# Patient Record
Sex: FEMALE | Race: BLACK OR AFRICAN AMERICAN | NOT HISPANIC OR LATINO | Employment: FULL TIME | URBAN - METROPOLITAN AREA
[De-identification: names, ages, dates, MRNs, and addresses within clinical notes are randomized per-mention and may not be internally consistent; named-entity substitution may affect disease eponyms.]

---

## 2023-07-19 ENCOUNTER — APPOINTMENT (OUTPATIENT)
Dept: LAB | Facility: LAB | Age: 38
End: 2023-07-19

## 2024-12-02 ENCOUNTER — APPOINTMENT (OUTPATIENT)
Dept: PRIMARY CARE | Facility: CLINIC | Age: 39
End: 2024-12-02
Payer: COMMERCIAL

## 2024-12-02 VITALS
BODY MASS INDEX: 39.31 KG/M2 | WEIGHT: 229 LBS | SYSTOLIC BLOOD PRESSURE: 103 MMHG | HEART RATE: 85 BPM | TEMPERATURE: 96 F | DIASTOLIC BLOOD PRESSURE: 68 MMHG

## 2024-12-02 DIAGNOSIS — Z12.4 ENCOUNTER FOR PAPANICOLAOU SMEAR FOR CERVICAL CANCER SCREENING: ICD-10-CM

## 2024-12-02 DIAGNOSIS — Z30.015 ENCOUNTER FOR INITIAL PRESCRIPTION OF VAGINAL RING HORMONAL CONTRACEPTIVE: ICD-10-CM

## 2024-12-02 DIAGNOSIS — Z13.220 SCREENING CHOLESTEROL LEVEL: ICD-10-CM

## 2024-12-02 DIAGNOSIS — Z23 IMMUNIZATION DUE: ICD-10-CM

## 2024-12-02 DIAGNOSIS — R63.5 ABNORMAL WEIGHT GAIN: ICD-10-CM

## 2024-12-02 DIAGNOSIS — Z12.31 VISIT FOR SCREENING MAMMOGRAM: ICD-10-CM

## 2024-12-02 DIAGNOSIS — M79.10 MYALGIA: ICD-10-CM

## 2024-12-02 DIAGNOSIS — Z00.00 HEALTH CARE MAINTENANCE: Primary | ICD-10-CM

## 2024-12-02 DIAGNOSIS — Z13.1 DIABETES MELLITUS SCREENING: ICD-10-CM

## 2024-12-02 DIAGNOSIS — Z12.11 COLON CANCER SCREENING: ICD-10-CM

## 2024-12-02 PROCEDURE — 99204 OFFICE O/P NEW MOD 45 MIN: CPT | Performed by: FAMILY MEDICINE

## 2024-12-02 PROCEDURE — 99386 PREV VISIT NEW AGE 40-64: CPT | Performed by: FAMILY MEDICINE

## 2024-12-02 RX ORDER — ETONOGESTREL AND ETHINYL ESTRADIOL VAGINAL RING .015; .12 MG/D; MG/D
1 RING VAGINAL
Qty: 3 EACH | Refills: 1 | Status: SHIPPED | OUTPATIENT
Start: 2024-12-02 | End: 2025-12-02

## 2024-12-02 RX ORDER — OXYCODONE AND ACETAMINOPHEN 5; 325 MG/1; MG/1
1 TABLET ORAL EVERY 6 HOURS PRN
COMMUNITY

## 2024-12-02 RX ORDER — MELOXICAM 15 MG/1
15 TABLET ORAL
COMMUNITY
Start: 2023-07-16

## 2024-12-02 ASSESSMENT — PAIN SCALES - GENERAL: PAINLEVEL_OUTOF10: 0-NO PAIN

## 2024-12-02 NOTE — ADDENDUM NOTE
Addended by: SALOMON GUADARRAMA on: 12/2/2024 10:14 AM     Modules accepted: Orders, Level of Service

## 2024-12-02 NOTE — PROGRESS NOTES
Subjective     Patient ID: Radha Alvarez is a 39 y.o. female who presents for Annual Exam (NEW PT).  HPI  The patient is a 40 yo AA female with a history of left sciatica, anxiety and depression, obesity, PCOS, vit D deficiency, here for establishment of care and for:    1- a CPE.   -pap smear-4/15/2020:  wnl. No HPV.  -mammogram- 2018-19.  Ordered.  -blood test ordered.  -immunizations: Influenza 11/20/2024.  -PGM - colon cancer and breast cancer.    2- Generalized tender or pulled muscle sensations.   3- weight management.  4- GYN referral.    A review of system was completed.  All systems were reviewed and were normal, except for the ones that are listed in the HPI.    Objective   Physical Exam  Constitutional:       Appearance: Normal appearance.   HENT:      Head: Normocephalic and atraumatic.      Right Ear: Tympanic membrane, ear canal and external ear normal.      Left Ear: Tympanic membrane, ear canal and external ear normal.      Nose: Nose normal.      Mouth/Throat:      Mouth: Mucous membranes are moist.      Pharynx: Oropharynx is clear.   Eyes:      General: Scleral icterus: sudha titter.      Extraocular Movements: Extraocular movements intact.      Conjunctiva/sclera: Conjunctivae normal.      Pupils: Pupils are equal, round, and reactive to light.   Cardiovascular:      Rate and Rhythm: Normal rate and regular rhythm.      Pulses: Normal pulses.   Pulmonary:      Effort: Pulmonary effort is normal.      Breath sounds: Normal breath sounds.   Abdominal:      General: Abdomen is flat. Bowel sounds are normal.      Palpations: Abdomen is soft.   Musculoskeletal:         General: Normal range of motion.      Cervical back: Normal range of motion and neck supple.   Skin:     General: Skin is warm.   Neurological:      General: No focal deficit present.      Mental Status: She is alert and oriented to person, place, and time. Mental status is at baseline.   Psychiatric:         Mood and Affect: Mood  normal.         Behavior: Behavior normal.         Thought Content: Thought content normal.         Judgment: Judgment normal.     Assessment/Plan   Problem List Items Addressed This Visit       Myalgia     -arthritis panel ordered.   -Duloxetine to consider if getting worse.         Screening cholesterol level    Relevant Orders    Lipid Panel    Health care maintenance - Primary     -pap smear-4/15/2020:  wnl. No HPV.  -mammogram- 2018-19.  Ordered.  -blood test ordered.  -immunizations: Influenza 11/20/2024.  -PGM - colon cancer and breast cancer.           Relevant Orders    BI mammo bilateral screening tomosynthesis    Arthritis Panel (CMS)    CBC    Comprehensive Metabolic Panel    Hemoglobin A1C    Lipid Panel    TSH with reflex to Free T4 if abnormal    Diabetes mellitus screening    Relevant Orders    Hemoglobin A1C    Visit for screening mammogram    Relevant Orders    BI mammo bilateral screening tomosynthesis    Colon cancer screening    Immunization due    Abnormal weight gain    Relevant Orders    Referral to Endocrinology    Referral to Nutrition Services     Other Visit Diagnoses       Encounter for Papanicolaou smear for cervical cancer screening        Relevant Orders    Referral to Gynecology    Encounter for initial prescription of vaginal ring hormonal contraceptive        Relevant Medications    etonogestreL-ethinyl estradioL (Nuvaring) 0.12-0.015 mg/24 hr vaginal ring         Patient to return to office in 6- 12 months for reassessment.

## 2024-12-02 NOTE — ASSESSMENT & PLAN NOTE
-pap smear-4/15/2020:  wnl. No HPV.  -mammogram- 2018-19.  Ordered.  -blood test ordered.  -immunizations: Influenza 11/20/2024.  -PGM - colon cancer and breast cancer.

## 2024-12-06 ENCOUNTER — OFFICE VISIT (OUTPATIENT)
Facility: CLINIC | Age: 39
End: 2024-12-06
Payer: COMMERCIAL

## 2024-12-06 ENCOUNTER — LAB (OUTPATIENT)
Dept: LAB | Facility: LAB | Age: 39
End: 2024-12-06
Payer: COMMERCIAL

## 2024-12-06 VITALS
HEIGHT: 64 IN | SYSTOLIC BLOOD PRESSURE: 120 MMHG | BODY MASS INDEX: 40.08 KG/M2 | DIASTOLIC BLOOD PRESSURE: 70 MMHG | RESPIRATION RATE: 16 BRPM | WEIGHT: 234.8 LBS | HEART RATE: 76 BPM

## 2024-12-06 DIAGNOSIS — Z13.220 SCREENING CHOLESTEROL LEVEL: ICD-10-CM

## 2024-12-06 DIAGNOSIS — E66.01 CLASS 3 SEVERE OBESITY DUE TO EXCESS CALORIES WITHOUT SERIOUS COMORBIDITY WITH BODY MASS INDEX (BMI) OF 40.0 TO 44.9 IN ADULT: Primary | ICD-10-CM

## 2024-12-06 DIAGNOSIS — Z00.00 HEALTH CARE MAINTENANCE: ICD-10-CM

## 2024-12-06 DIAGNOSIS — Z13.1 DIABETES MELLITUS SCREENING: ICD-10-CM

## 2024-12-06 DIAGNOSIS — E66.813 CLASS 3 SEVERE OBESITY DUE TO EXCESS CALORIES WITHOUT SERIOUS COMORBIDITY WITH BODY MASS INDEX (BMI) OF 40.0 TO 44.9 IN ADULT: Primary | ICD-10-CM

## 2024-12-06 DIAGNOSIS — R63.5 ABNORMAL WEIGHT GAIN: ICD-10-CM

## 2024-12-06 PROBLEM — E66.811 CLASS 1 OBESITY: Status: ACTIVE | Noted: 2024-12-06

## 2024-12-06 LAB
ALBUMIN SERPL BCP-MCNC: 4.2 G/DL (ref 3.4–5)
ALP SERPL-CCNC: 95 U/L (ref 33–110)
ALT SERPL W P-5'-P-CCNC: 20 U/L (ref 7–45)
ANION GAP SERPL CALC-SCNC: 14 MMOL/L (ref 10–20)
AST SERPL W P-5'-P-CCNC: 20 U/L (ref 9–39)
BILIRUB SERPL-MCNC: 0.5 MG/DL (ref 0–1.2)
BUN SERPL-MCNC: 9 MG/DL (ref 6–23)
CALCIUM SERPL-MCNC: 9.7 MG/DL (ref 8.6–10.6)
CHLORIDE SERPL-SCNC: 102 MMOL/L (ref 98–107)
CHOLEST SERPL-MCNC: 219 MG/DL (ref 0–199)
CHOLESTEROL/HDL RATIO: 3.9
CO2 SERPL-SCNC: 27 MMOL/L (ref 21–32)
CREAT SERPL-MCNC: 0.92 MG/DL (ref 0.5–1.05)
EGFRCR SERPLBLD CKD-EPI 2021: 81 ML/MIN/1.73M*2
ERYTHROCYTE [DISTWIDTH] IN BLOOD BY AUTOMATED COUNT: 12 % (ref 11.5–14.5)
ERYTHROCYTE [SEDIMENTATION RATE] IN BLOOD BY WESTERGREN METHOD: 12 MM/H (ref 0–20)
EST. AVERAGE GLUCOSE BLD GHB EST-MCNC: 103 MG/DL
GLUCOSE SERPL-MCNC: 87 MG/DL (ref 74–99)
HBA1C MFR BLD: 5.2 %
HCT VFR BLD AUTO: 43.9 % (ref 36–46)
HDLC SERPL-MCNC: 55.7 MG/DL
HGB BLD-MCNC: 14.5 G/DL (ref 12–16)
LDLC SERPL CALC-MCNC: 150 MG/DL
MCH RBC QN AUTO: 29.4 PG (ref 26–34)
MCHC RBC AUTO-ENTMCNC: 33 G/DL (ref 32–36)
MCV RBC AUTO: 89 FL (ref 80–100)
NON HDL CHOLESTEROL: 163 MG/DL (ref 0–149)
NRBC BLD-RTO: 0 /100 WBCS (ref 0–0)
PLATELET # BLD AUTO: 423 X10*3/UL (ref 150–450)
POTASSIUM SERPL-SCNC: 4.1 MMOL/L (ref 3.5–5.3)
PROT SERPL-MCNC: 7.9 G/DL (ref 6.4–8.2)
RBC # BLD AUTO: 4.93 X10*6/UL (ref 4–5.2)
RHEUMATOID FACT SER NEPH-ACNC: <10 IU/ML (ref 0–15)
SODIUM SERPL-SCNC: 139 MMOL/L (ref 136–145)
TRIGL SERPL-MCNC: 66 MG/DL (ref 0–149)
TSH SERPL-ACNC: 1.02 MIU/L (ref 0.44–3.98)
URATE SERPL-MCNC: 4.9 MG/DL (ref 2.3–6.7)
VLDL: 13 MG/DL (ref 0–40)
WBC # BLD AUTO: 5.5 X10*3/UL (ref 4.4–11.3)

## 2024-12-06 PROCEDURE — 1036F TOBACCO NON-USER: CPT | Performed by: INTERNAL MEDICINE

## 2024-12-06 PROCEDURE — 85027 COMPLETE CBC AUTOMATED: CPT

## 2024-12-06 PROCEDURE — 85652 RBC SED RATE AUTOMATED: CPT

## 2024-12-06 PROCEDURE — 99204 OFFICE O/P NEW MOD 45 MIN: CPT | Performed by: INTERNAL MEDICINE

## 2024-12-06 PROCEDURE — 84443 ASSAY THYROID STIM HORMONE: CPT

## 2024-12-06 PROCEDURE — 80053 COMPREHEN METABOLIC PANEL: CPT

## 2024-12-06 PROCEDURE — 83036 HEMOGLOBIN GLYCOSYLATED A1C: CPT

## 2024-12-06 PROCEDURE — 86038 ANTINUCLEAR ANTIBODIES: CPT

## 2024-12-06 PROCEDURE — 3008F BODY MASS INDEX DOCD: CPT | Performed by: INTERNAL MEDICINE

## 2024-12-06 PROCEDURE — 80061 LIPID PANEL: CPT

## 2024-12-06 PROCEDURE — 84550 ASSAY OF BLOOD/URIC ACID: CPT

## 2024-12-06 PROCEDURE — 86431 RHEUMATOID FACTOR QUANT: CPT

## 2024-12-06 PROCEDURE — 36415 COLL VENOUS BLD VENIPUNCTURE: CPT

## 2024-12-06 RX ORDER — SEMAGLUTIDE 0.25 MG/.5ML
0.25 INJECTION, SOLUTION SUBCUTANEOUS
Qty: 2 ML | Refills: 0 | Status: SHIPPED | OUTPATIENT
Start: 2024-12-06

## 2024-12-06 RX ORDER — DEXTROMETHORPHAN HYDROBROMIDE, GUAIFENESIN 5; 100 MG/5ML; MG/5ML
650 LIQUID ORAL EVERY 8 HOURS PRN
COMMUNITY

## 2024-12-06 ASSESSMENT — ENCOUNTER SYMPTOMS
HEADACHES: 0
FEVER: 0
COUGH: 0
FATIGUE: 0
SHORTNESS OF BREATH: 0
PALPITATIONS: 0
DIARRHEA: 0
CHILLS: 0
NAUSEA: 0
VOMITING: 0

## 2024-12-06 NOTE — PATIENT INSTRUCTIONS
Blood work today  Wegovy will be called in for you  Keep working on low-carb high-protein low calorie diet and increasing exercise  Further plans based on blood work results and prescription coverage.  I encouraged her to call or message with any concerns or questions

## 2024-12-06 NOTE — PROGRESS NOTES
Endocrinology New Patient Consult  Subjective   Patient ID: Radha Alvarez is a 39 y.o. female who presents for Weight Gain. Patient was referred by Dr. Cuevas.     PCP: No Assigned PCP Generic Provider, MD    HPI  39-year-old referred by Dr. Salinas for evaluation of obesity.  Patient states she is struggled with her weight since being out of the  and having a baby at age 36.  She has been struggling with her eating habits and lately has been working hard on a low-carb plan and trying to increase exercise with limited success.  She has lost about 10 pounds in the last year.  She is very interested in weight loss medication.  She has no history of pancreatitis.  She had no history of gestational diabetes.  She has not had any recent blood work but is going today.  She has had irregular menstrual periods for quite some time and has a diagnosis of PCOS on her chart.  She has not been on metformin in the past.  She did try Adipex several years ago but feels very jittery on it and had to stop due to intolerance    Review of Systems   Constitutional:  Negative for chills, fatigue and fever.   Respiratory:  Negative for cough and shortness of breath.    Cardiovascular:  Negative for chest pain and palpitations.   Gastrointestinal:  Negative for diarrhea, nausea and vomiting.   Neurological:  Negative for headaches.       Patient Active Problem List   Diagnosis    Myalgia    Screening cholesterol level    Health care maintenance    Diabetes mellitus screening    Visit for screening mammogram    Colon cancer screening    Immunization due    Abnormal weight gain    Class 1 obesity    Obesity    PCOS (polycystic ovarian syndrome)       History reviewed. No pertinent past medical history.     History reviewed. No pertinent surgical history.     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    Family History   Problem Relation Name Age of Onset    COPD Mother          Home Meds:  Current Outpatient Medications   Medication Instructions    acetaminophen (TYLENOL 8 HOUR) 650 mg, Every 8 hours PRN    etonogestreL-ethinyl estradioL (Nuvaring) 0.12-0.015 mg/24 hr vaginal ring 1 each, vaginal, Every 28 days, leave in place for 3 consecutive weeks and remove for 1 week (7 days)    meloxicam (MOBIC) 15 mg, Daily RT    oxyCODONE-acetaminophen (Percocet) 5-325 mg tablet 1 tablet, Every 6 hours PRN    Wegovy 0.25 mg, subcutaneous, Every 7 days        Allergies   Allergen Reactions    Lactase GI Upset and Other     Other reaction(s): GI Upset    Latex Angioedema, Itching and Unknown     redness and swelling    Other reaction(s): Redness   redness and swelling        Objective   Vitals:    12/06/24 1450   BP: 120/70   Pulse: 76   Resp: 16      Vitals:    12/06/24 1450   Weight: 107 kg (234 lb 12.8 oz)      Body mass index is 40.3 kg/m².   Physical Exam  Constitutional:       Appearance: Normal appearance. She is overweight.   HENT:      Head: Normocephalic and atraumatic.   Neck:      Thyroid: No thyroid mass, thyromegaly or thyroid tenderness.   Cardiovascular:      Rate and Rhythm: Normal rate and regular rhythm.      Heart sounds: No murmur heard.     No gallop.   Pulmonary:      Effort: Pulmonary effort is normal.      Breath sounds: Normal breath sounds.   Abdominal:      Palpations: Abdomen is soft.      Comments: benign   Neurological:      General: No focal deficit present.      Mental Status: She is alert and oriented to person, place, and time.      Deep Tendon Reflexes: Reflexes are normal and symmetric.   Psychiatric:         Behavior: Behavior is cooperative.         Labs:  Lab Results   Component Value Date    HGBA1C 5.2 04/10/2020      No results found for: \"PR1\", \"THYROIDPAB\", \"TSI\"     Assessment/Plan   Assessment & Plan  Abnormal weight " gain    Orders:    Referral to Endocrinology    Class 3 severe obesity due to excess calories without serious comorbidity with body mass index (BMI) of 40.0 to 44.9 in adult    Orders:    semaglutide, weight loss, (Wegovy) 0.25 mg/0.5 mL pen injector; Inject 0.25 mg under the skin every 7 days.  39-year-old here for evaluation of obesity.  We discussed her course.  We reviewed weight loss medications in detail.  She will start with comprehensive blood work today.  We discussed weight loss options most notably GLP-1 agents versus Qsymia.  She was on Adipex in the past and was intolerant.  We discussed phentermine is an active ingredient Qsymia but is a much smaller dose and controlled release.  I encouraged her to keep working on her low-carb diet and activity plan.  We will call in the medication and go from there as far as further plans.  I encouraged her to call or message with concerns or questions    Electronically signed by:  Estefanía Child MD 12/06/24  3:57 PM

## 2024-12-06 NOTE — LETTER
December 6, 2024     Sophie Cuevas MD  [address]  [facility] [id number]    Patient: Radha Alvarez   YOB: 1985   Date of Visit: 12/6/2024       Dear Dr. Sophie Cuevas MD:    Thank you for referring Radha Alvarez to me for evaluation. Below are my notes for this consultation.  If you have questions, please do not hesitate to call me. I look forward to following your patient along with you.       Sincerely,     Estefanía Child MD      CC: No Recipients  ______________________________________________________________________________________    Endocrinology New Patient Consult  Subjective  Patient ID: Radha Alvarez is a 39 y.o. female who presents for Weight Gain. Patient was referred by Dr. Cuevas.     PCP: No Assigned PCP Generic Provider, MD    HPI  39-year-old referred by Dr. Salinas for evaluation of obesity.  Patient states she is struggled with her weight since being out of the  and having a baby at age 36.  She has been struggling with her eating habits and lately has been working hard on a low-carb plan and trying to increase exercise with limited success.  She has lost about 10 pounds in the last year.  She is very interested in weight loss medication.  She has no history of pancreatitis.  She had no history of gestational diabetes.  She has not had any recent blood work but is going today.  She has had irregular menstrual periods for quite some time and has a diagnosis of PCOS on her chart.  She has not been on metformin in the past.  She did try Adipex several years ago but feels very jittery on it and had to stop due to intolerance    Review of Systems   Constitutional:  Negative for chills, fatigue and fever.   Respiratory:  Negative for cough and shortness of breath.    Cardiovascular:  Negative for chest pain and palpitations.   Gastrointestinal:  Negative for diarrhea, nausea and vomiting.   Neurological:  Negative for headaches.        Patient Active Problem List   Diagnosis   • Myalgia   • Screening cholesterol level   • Health care maintenance   • Diabetes mellitus screening   • Visit for screening mammogram   • Colon cancer screening   • Immunization due   • Abnormal weight gain   • Class 1 obesity   • Obesity   • PCOS (polycystic ovarian syndrome)       History reviewed. No pertinent past medical history.     History reviewed. No pertinent surgical history.     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    Family History   Problem Relation Name Age of Onset   • COPD Mother          Home Meds:  Current Outpatient Medications   Medication Instructions   • acetaminophen (TYLENOL 8 HOUR) 650 mg, Every 8 hours PRN   • etonogestreL-ethinyl estradioL (Nuvaring) 0.12-0.015 mg/24 hr vaginal ring 1 each, vaginal, Every 28 days, leave in place for 3 consecutive weeks and remove for 1 week (7 days)   • meloxicam (MOBIC) 15 mg, Daily RT   • oxyCODONE-acetaminophen (Percocet) 5-325 mg tablet 1 tablet, Every 6 hours PRN   • Wegovy 0.25 mg, subcutaneous, Every 7 days        Allergies   Allergen Reactions   • Lactase GI Upset and Other     Other reaction(s): GI Upset   • Latex Angioedema, Itching and Unknown     redness and swelling    Other reaction(s): Redness   redness and swelling        Objective  Vitals:    12/06/24 1450   BP: 120/70   Pulse: 76   Resp: 16      Vitals:    12/06/24 1450   Weight: 107 kg (234 lb 12.8 oz)      Body mass index is 40.3 kg/m².   Physical Exam  Constitutional:       Appearance: Normal appearance. She is overweight.   HENT:      Head: Normocephalic and atraumatic.   Neck:      Thyroid: No thyroid mass, thyromegaly or thyroid tenderness.   Cardiovascular:      Rate and Rhythm: Normal rate and regular rhythm.      Heart sounds: No murmur heard.     No  "gallop.   Pulmonary:      Effort: Pulmonary effort is normal.      Breath sounds: Normal breath sounds.   Abdominal:      Palpations: Abdomen is soft.      Comments: benign   Neurological:      General: No focal deficit present.      Mental Status: She is alert and oriented to person, place, and time.      Deep Tendon Reflexes: Reflexes are normal and symmetric.   Psychiatric:         Behavior: Behavior is cooperative.         Labs:  Lab Results   Component Value Date    HGBA1C 5.2 04/10/2020      No results found for: \"PR1\", \"THYROIDPAB\", \"TSI\"     Assessment/Plan  Assessment & Plan  Abnormal weight gain    Orders:  •  Referral to Endocrinology    Class 3 severe obesity due to excess calories without serious comorbidity with body mass index (BMI) of 40.0 to 44.9 in adult    Orders:  •  semaglutide, weight loss, (Wegovy) 0.25 mg/0.5 mL pen injector; Inject 0.25 mg under the skin every 7 days.  39-year-old here for evaluation of obesity.  We discussed her course.  We reviewed weight loss medications in detail.  She will start with comprehensive blood work today.  We discussed weight loss options most notably GLP-1 agents versus Qsymia.  She was on Adipex in the past and was intolerant.  We discussed phentermine is an active ingredient Qsymia but is a much smaller dose and controlled release.  I encouraged her to keep working on her low-carb diet and activity plan.  We will call in the medication and go from there as far as further plans.  I encouraged her to call or message with concerns or questions    Electronically signed by:  Estefanía Child MD 12/06/24  3:57 PM    "

## 2024-12-06 NOTE — ASSESSMENT & PLAN NOTE
Orders:    semaglutide, weight loss, (Wegovy) 0.25 mg/0.5 mL pen injector; Inject 0.25 mg under the skin every 7 days.  39-year-old here for evaluation of obesity.  We discussed her course.  We reviewed weight loss medications in detail.  She will start with comprehensive blood work today.  We discussed weight loss options most notably GLP-1 agents versus Qsymia.  She was on Adipex in the past and was intolerant.  We discussed phentermine is an active ingredient Qsymia but is a much smaller dose and controlled release.  I encouraged her to keep working on her low-carb diet and activity plan.  We will call in the medication and go from there as far as further plans.  I encouraged her to call or message with concerns or questions

## 2024-12-09 ENCOUNTER — TELEMEDICINE CLINICAL SUPPORT (OUTPATIENT)
Dept: NUTRITION | Facility: HOSPITAL | Age: 39
End: 2024-12-09
Payer: COMMERCIAL

## 2024-12-09 VITALS — HEIGHT: 64 IN | BODY MASS INDEX: 40.3 KG/M2

## 2024-12-09 DIAGNOSIS — R63.5 ABNORMAL WEIGHT GAIN: Primary | ICD-10-CM

## 2024-12-09 LAB — ANA SER QL HEP2 SUBST: NEGATIVE

## 2024-12-09 PROCEDURE — 97802 MEDICAL NUTRITION INDIV IN: CPT | Performed by: DIETITIAN, REGISTERED

## 2024-12-09 NOTE — PATIENT INSTRUCTIONS
Follow the Healthy Plate Method at meals- see handout.  This will help to increase your vegetable intake and decrease portion sizes of carbohydrates.  When eating starchy foods, try to eat whole grains like potato with the skin, whole grain breads and cereals, brown rices and pastas.  Include protein with all meals and snacks.  Lean protein are better for cholesterol levels such as chicken(no skin), fish (baked or broiled or grilled), low-fat dairy, eggs, and peanut butter or nuts.   - - For high protein snack ideas check out: https://www.[employer]/article/557311/10-high-protein-snacks-that-keep-you-feeling-full-longer/    4.   Reduce your weight by 1-2# per week to reach your goal weight.     - See sample meal plans attached  - Consider tracking your calorie intake on an cande such as [employer] website or [employer] to track your calories.    5.   Increase fiber to 25-3g per day to help keep you barajas and lower cholesterol levels.  You may consider a fiber supplement such as Benefiber or Metamucil everyday.    6.   Aim to drink 64 oz of water or other non-caloric beverages daily.  7.   Aim for 30 minutes of exercise on most days.

## 2024-12-09 NOTE — RESULT ENCOUNTER NOTE
Patient's blood test showed signs of elevated cholesterol level. A Low-fat/low-cholesterol diet, weight loss and increased exercise is recommended.

## 2024-12-09 NOTE — PROGRESS NOTES
"Nutrition Assessment     Reason for Visit:  Radha Alvarez is a 39 y.o. female who presents for nutrition counseling   Anthropometrics:  Wt Readings from Last 10 Encounters:   12/06/24 107 kg (234 lb 12.8 oz)   12/02/24 104 kg (229 lb)   07/07/22 97.6 kg (215 lb 1 oz)   12/23/20 101 kg (222 lb)   11/25/20 101 kg (222 lb)   11/17/20 110 kg (242 lb 15.2 oz)   11/05/20 112 kg (246 lb)   10/29/20 110 kg (243 lb)   10/20/20 109 kg (240 lb 11.9 oz)   10/15/20 110 kg (243 lb)     Lab Results   Component Value Date    HGBA1C 5.2 12/06/2024      Anthropometrics  Height: 162.6 cm (5' 4\")    Food And Nutrient Intake:  Food and Nutrient History  Food and Nutrient History: Pt presents for nutrition counseling in the context of weight management.  BMI of 40 indicates class II obesity.   Labwork showed an elevated LDL.  Diet recall reveals that pt has healthy routines with meal and includes vegetables with her meals.  She enjoys exercise and swims 2 x/week.  Pt may be getting an excess of sugar and calories from her beverages as she has difficulty drinking enough water.  Pt may benefit from following a [id number] calories diet to promote gradual weight loss and increasing soluble fiber to help lower LDL.  Fluid Intake: sodas(Ginger ale) or juice; lacks water  GI Symptoms: none  Sleep Duration/Quality: 5-6 hrs disrupted     Food Intake  Meal 1: breakfast: white milk chocolate Latte x 2/week; coffee- black; 1T of zero creamer; moyer, eggs, toast  Meal 2: lunch: leftover or salad with fruit; 4x/week  Meal 3: eats out occ  Snacks: chips     Physical Activities:  Physical Activity  Frequency (times/week): 2 (times/week)  Duration (minutes/day): 45 minutes/day  Type of Physical Activity: swimming       Nutrition Focused Physical Exam:  Deferred- telehealth visit     Energy Needs  Calculated Energy Needs Using Equations  Height: 162.6 cm (5' 4\")  Weight Used for Equation Calculations: 105 kg (231 lb)  [facility] " (Overweight or Obese Patients): 1703  Activity Factor: 1.3  Total Energy Needs: 2213  Estimated Energy Needs  Total Energy Estimated Needs (kCal): 1713 kCal  Method for Estimating Needs: MSJ x 1.3-500 kcals        Nutrition Diagnosis   Malnutrition Diagnosis  Patient has Malnutrition Diagnosis: No  Nutrition Diagnosis  Patient has Nutrition Diagnosis: Yes  Diagnosis Status (1): New  Nutrition Diagnosis 1: Obese  Related to (1): predicted excessive energy intake  As Evidenced by (1): diet recall; pt interview    Nutrition Interventions/Recommendations   Food and Nutrition Delivery  Meals & Snacks: Carbohydrate-modified diet, Energy-modified diet, Fiber-modified diet, General Healthful Diet, Protein-modified diet, Modify schedule of foods/fluids, Modify Composition of Meals/Snacks  Nutrition Education  Nutrition Education Content: Content related nutrition education, Education on nutrition's influence on health, Physical activity guidance  Goals: Instructed on counting macronutrient intake, proper portion sizes,  and general healthful nutrition. Instructed on benefits of wt loss with heart health. Discussed mindful eating, slow gradual wt loss and goals beyond wt. Instructed pt to not skip meals - discussed this may lead to over eating later or snacking more than planned. Instructed on importance of physical activity for wt loss and maintenance of wt loss. Both verbal and written education provided in the one-on-one setting. Pt verbalized understanding of information provided. All questions answered to pt satisfaction throughout visit        Patient Instructions   Follow the Healthy Plate Method at meals- see handout.  This will help to increase your vegetable intake and decrease portion sizes of carbohydrates.  When eating starchy foods, try to eat whole grains like potato with the skin, whole grain breads and cereals, brown rices and pastas.  Include protein with all meals and snacks.  Lean protein are better for  cholesterol levels such as chicken(no skin), fish (baked or broiled or grilled), low-fat dairy, eggs, and peanut butter or nuts.   - - For high protein snack ideas check out: https://www.iodine.[employer]/article/712237/10-high-protein-snacks-that-keep-you-feeling-full-longer/    4.   Reduce your weight by 1-2# per week to reach your goal weight.     - See sample meal plans attached  - Consider tracking your calorie intake on an cande such as [employer] or [employer] to track your calories.    5.   Increase fiber to 25-3g per day to help keep you barajas and lower cholesterol levels.  You may consider a fiber supplement such as Benefiber or Metamucil everyday.    6.   Aim to drink 64 oz of water or other non-caloric beverages daily.  7.   Aim for 30 minutes of exercise on most days.        Nutrition Monitoring and Evaluation   Food/Nutrient Related History Monitoring  Monitoring and Evaluation Plan: Energy intake, Meal/snack pattern, Fiber intake, Carbohydrate intake, Amount of food, Fluid intake, Protein intake  Criteria: 1600 kcal/day  Criteria: aim for at least 64 oz of water daily  Criteria: Aim for 3 meals/day with 1-2 snack  Meal/Snack Pattern: Food variety, Estimated meal and snack pattern  Criteria: Follow plate method when planning meals (1/2 plate non-starchy vegetables, 1/4 plate lean protein, 1/4 plate carbohydrates).  Criteria: Choose lean protein sources including chicken, turkey, seafood, and eggs. Be sure to include protein with each meal and snack  Criteria: Limit added sugar to less than 25 g per day  Criteria: Increase fiber from fruits, vegetables, whole grains, and beans/lentils  Additional Plan: Provided pt with the following handouts: wt loss tips, [id number] calorie 5 day meal plan, high protein foods/snack list, exercise, label reading, plate method  Knowledge/Beliefs/Attitudes  Monitoring and Evaluation Plan: Physical activity  Criteria: Encouraged regular physical activity including strength  training as medically appropriate per AHA guidelines  Body Composition/Growth/Weight History  Monitoring and Evaluation Plan: Weight change  Weight Change: Weight change intent  Criteria: 1-2 lb wt loss per week  Biochemical Data, Medical Tests and Procedures  Monitoring and Evaluation Plan: Lipid profile      Readiness to Change : Good  Level of Understanding : Good  Anticipated Compliant : Good

## 2024-12-11 ENCOUNTER — TELEPHONE (OUTPATIENT)
Dept: PRIMARY CARE | Facility: CLINIC | Age: 39
End: 2024-12-11

## 2024-12-23 ENCOUNTER — APPOINTMENT (OUTPATIENT)
Dept: OBSTETRICS AND GYNECOLOGY | Facility: CLINIC | Age: 39
End: 2024-12-23
Payer: COMMERCIAL

## 2025-01-08 DIAGNOSIS — E66.813 CLASS 3 SEVERE OBESITY DUE TO EXCESS CALORIES WITHOUT SERIOUS COMORBIDITY WITH BODY MASS INDEX (BMI) OF 40.0 TO 44.9 IN ADULT: ICD-10-CM

## 2025-01-08 DIAGNOSIS — E66.01 CLASS 3 SEVERE OBESITY DUE TO EXCESS CALORIES WITHOUT SERIOUS COMORBIDITY WITH BODY MASS INDEX (BMI) OF 40.0 TO 44.9 IN ADULT: ICD-10-CM

## 2025-01-08 RX ORDER — SEMAGLUTIDE 0.5 MG/.5ML
0.5 INJECTION, SOLUTION SUBCUTANEOUS
Qty: 2 ML | Refills: 0 | Status: SHIPPED | OUTPATIENT
Start: 2025-01-08

## 2025-01-09 ENCOUNTER — OFFICE VISIT (OUTPATIENT)
Dept: URGENT CARE | Age: 40
End: 2025-01-09
Payer: COMMERCIAL

## 2025-01-09 VITALS
TEMPERATURE: 98 F | HEIGHT: 64 IN | SYSTOLIC BLOOD PRESSURE: 121 MMHG | RESPIRATION RATE: 17 BRPM | DIASTOLIC BLOOD PRESSURE: 73 MMHG | BODY MASS INDEX: 38.41 KG/M2 | HEART RATE: 88 BPM | OXYGEN SATURATION: 98 % | WEIGHT: 225 LBS

## 2025-01-09 DIAGNOSIS — N76.0 BV (BACTERIAL VAGINOSIS): ICD-10-CM

## 2025-01-09 DIAGNOSIS — Z71.1 CONCERN ABOUT STD IN FEMALE WITHOUT DIAGNOSIS: ICD-10-CM

## 2025-01-09 DIAGNOSIS — N89.8 VAGINAL ODOR: Primary | ICD-10-CM

## 2025-01-09 DIAGNOSIS — B96.89 BV (BACTERIAL VAGINOSIS): ICD-10-CM

## 2025-01-09 LAB
POC APPEARANCE, URINE: CLEAR
POC BILIRUBIN, URINE: NEGATIVE
POC BLOOD, URINE: ABNORMAL
POC COLOR, URINE: YELLOW
POC GLUCOSE, URINE: NEGATIVE MG/DL
POC KETONES, URINE: NEGATIVE MG/DL
POC LEUKOCYTES, URINE: NEGATIVE
POC NITRITE,URINE: NEGATIVE
POC PH, URINE: 6.5 PH
POC PROTEIN, URINE: NEGATIVE MG/DL
POC SPECIFIC GRAVITY, URINE: 1.01
POC UROBILINOGEN, URINE: 0.2 EU/DL
PREGNANCY TEST URINE, POC: NEGATIVE

## 2025-01-09 PROCEDURE — 87491 CHLMYD TRACH DNA AMP PROBE: CPT

## 2025-01-09 PROCEDURE — 87205 SMEAR GRAM STAIN: CPT

## 2025-01-09 PROCEDURE — 1036F TOBACCO NON-USER: CPT | Performed by: PHYSICIAN ASSISTANT

## 2025-01-09 PROCEDURE — 3008F BODY MASS INDEX DOCD: CPT | Performed by: PHYSICIAN ASSISTANT

## 2025-01-09 PROCEDURE — 99204 OFFICE O/P NEW MOD 45 MIN: CPT | Performed by: PHYSICIAN ASSISTANT

## 2025-01-09 PROCEDURE — 87591 N.GONORRHOEAE DNA AMP PROB: CPT

## 2025-01-09 PROCEDURE — 81025 URINE PREGNANCY TEST: CPT | Performed by: PHYSICIAN ASSISTANT

## 2025-01-09 PROCEDURE — 81003 URINALYSIS AUTO W/O SCOPE: CPT | Performed by: PHYSICIAN ASSISTANT

## 2025-01-09 RX ORDER — METRONIDAZOLE 500 MG/1
500 TABLET ORAL 2 TIMES DAILY
Qty: 14 TABLET | Refills: 0 | Status: SHIPPED | OUTPATIENT
Start: 2025-01-09 | End: 2025-01-16

## 2025-01-09 ASSESSMENT — ENCOUNTER SYMPTOMS
HEMATURIA: 0
DYSURIA: 0

## 2025-01-09 NOTE — PATIENT INSTRUCTIONS
Avoid intercourse  until tests result and you receive treatment if necessary   If anything is positive please notify your partners  Do not drink alcohol while taking flagyl, will make you very ill

## 2025-01-09 NOTE — PROGRESS NOTES
"Subjective   Patient ID: Radha Alvarez is a 40 y.o. female. They present today with a chief complaint of odor from vaginal area (Started Tuesday odor, patient says something feels off worried about bv) and Difficulty Urinating (Slight irritation with urinating ).    History of Present Illness  Patient is a 40 year old female presenting for evaluation of vaginal odor. Reports unprotexted sex a few days ago and wants to rule out STD. Denies vaginal discharge but had an odor the last 2 days and a bit of blood when she wiped after urinating this morning. Reports irregular menstrual cycle. Denies pelvic pain.      History provided by:  Patient   used: No    Difficulty Urinating  Associated symptoms: no vaginal discharge        Past Medical History  Allergies as of 2025 - Reviewed 2025   Allergen Reaction Noted    Lactase GI Upset and Other 2021    Latex Angioedema, Itching, and Unknown 2010       (Not in a hospital admission)       History reviewed. No pertinent past medical history.    Past Surgical History:   Procedure Laterality Date     SECTION, CLASSIC          reports that she has never smoked. She has never used smokeless tobacco. She reports current alcohol use. She reports that she does not use drugs.    Review of Systems  Review of Systems   Genitourinary:  Negative for dysuria, hematuria, pelvic pain and vaginal discharge.                                  Objective    Vitals:    25 1129   BP: 121/73   BP Location: Left arm   Patient Position: Sitting   BP Cuff Size: Adult   Pulse: 88   Resp: 17   Temp: 36.7 °C (98 °F)   TempSrc: Oral   SpO2: 98%   Weight: 102 kg (225 lb)   Height: 1.626 m (5' 4\")     No LMP recorded.    Physical Exam  Constitutional:       General: She is not in acute distress.     Appearance: Normal appearance. She is normal weight. She is not ill-appearing, toxic-appearing or diaphoretic.   HENT:      Head: Normocephalic. No right " periorbital erythema or left periorbital erythema.      Jaw: There is normal jaw occlusion. No trismus.   Eyes:      General: No scleral icterus.        Right eye: No discharge.         Left eye: No discharge.      Conjunctiva/sclera: Conjunctivae normal.   Neck:      Trachea: Phonation normal.   Cardiovascular:      Rate and Rhythm: Normal rate and regular rhythm.      Heart sounds: No murmur heard.     No friction rub. No gallop.   Pulmonary:      Effort: Pulmonary effort is normal. No respiratory distress.      Breath sounds: Normal breath sounds. No stridor. No wheezing, rhonchi or rales.   Chest:      Chest wall: No tenderness.   Neurological:      Mental Status: She is alert.   Psychiatric:         Mood and Affect: Mood normal.         Behavior: Behavior normal.         Thought Content: Thought content normal.         Procedures    Point of Care Test & Imaging Results from this visit  Results for orders placed or performed in visit on 01/09/25   POCT UA Automated manually resulted   Result Value Ref Range    POC Color, Urine Yellow Straw, Yellow, Light-Yellow    POC Appearance, Urine Clear Clear    POC Glucose, Urine NEGATIVE NEGATIVE mg/dl    POC Bilirubin, Urine NEGATIVE NEGATIVE    POC Ketones, Urine NEGATIVE NEGATIVE mg/dl    POC Specific Gravity, Urine 1.015 1.005 - 1.035    POC Blood, Urine MODERATE (2+) (A) NEGATIVE    POC PH, Urine 6.5 No Reference Range Established PH    POC Protein, Urine NEGATIVE NEGATIVE mg/dl    POC Urobilinogen, Urine 0.2 0.2, 1.0 EU/DL    Poc Nitrite, Urine NEGATIVE NEGATIVE    POC Leukocytes, Urine NEGATIVE NEGATIVE   POCT pregnancy, urine manually resulted   Result Value Ref Range    Preg Test, Ur Negative Negative      No results found.    Diagnostic study results (if any) were reviewed by Lorena Brothers PA-C.    Assessment/Plan   Allergies, medications, history, and pertinent labs/EKGs/Imaging reviewed by Lorena Brothers PA-C.     Medical Decision Making  Patient  presenting for STD testing. Hemodynamically stable. Well appearing. Exam as above.  exam deferred, only symptom vaginal odor. Discussed unable to rule out HIV or syphilis, should follow up with OB/GYN or PCP if concern for testing. STD testing in process. Will start treatment for BV given symptoms.     At time of discharge, patient was clinically well-appearing and appropriate for outpatient management. The patient/parent/guardian was educated regarding diagnosis, supportive care, OTC and Rx medications. The patient/parent/guardian was given the opportunity to ask questions prior to discharge. They verbalized understanding of discussion of treatment plan, expected course of illness and/or injury, indications on when to return to , when to seek further evaluation in ED/call 911, and the need to follow up with PCP and/or specialist as referred. Patient/parent/guardian was provided with work/school documentation if requested. Patient stable upon discharge.     Orders and Diagnoses  Diagnoses and all orders for this visit:  Vaginal odor  -     Vaginitis Gram Stain For Bacterial Vaginosis + Yeast  -     C. trachomatis / N. gonorrhoeae, Amplified  -     Trichomonas vaginalis, Amplified; Future  Concern about STD in female without diagnosis  -     POCT UA Automated manually resulted  -     POCT pregnancy, urine manually resulted  BV (bacterial vaginosis)  -     metroNIDAZOLE (Flagyl) 500 mg tablet; Take 1 tablet (500 mg) by mouth 2 times a day for 7 days.      Medical Admin Record      Patient disposition: Home    Electronically signed by Lorena Brothers PA-C  1:25 PM

## 2025-01-10 LAB
C TRACH RRNA SPEC QL NAA+PROBE: NEGATIVE
CLUE CELLS VAG LPF-#/AREA: PRESENT /[LPF]
N GONORRHOEA DNA SPEC QL PROBE+SIG AMP: NEGATIVE
NUGENT SCORE: 9
YEAST VAG WET PREP-#/AREA: ABNORMAL

## 2025-02-16 DIAGNOSIS — E66.813 CLASS 3 SEVERE OBESITY DUE TO EXCESS CALORIES WITHOUT SERIOUS COMORBIDITY WITH BODY MASS INDEX (BMI) OF 40.0 TO 44.9 IN ADULT: ICD-10-CM

## 2025-02-16 DIAGNOSIS — E66.01 CLASS 3 SEVERE OBESITY DUE TO EXCESS CALORIES WITHOUT SERIOUS COMORBIDITY WITH BODY MASS INDEX (BMI) OF 40.0 TO 44.9 IN ADULT: ICD-10-CM

## 2025-02-17 RX ORDER — SEMAGLUTIDE 1 MG/.5ML
1 INJECTION, SOLUTION SUBCUTANEOUS
Qty: 2 ML | Refills: 0 | Status: SHIPPED | OUTPATIENT
Start: 2025-02-17

## 2025-03-03 ENCOUNTER — APPOINTMENT (OUTPATIENT)
Dept: OBSTETRICS AND GYNECOLOGY | Facility: CLINIC | Age: 40
End: 2025-03-03
Payer: COMMERCIAL

## 2025-03-10 ENCOUNTER — TELEMEDICINE CLINICAL SUPPORT (OUTPATIENT)
Dept: NUTRITION | Facility: HOSPITAL | Age: 40
End: 2025-03-10
Payer: COMMERCIAL

## 2025-03-10 VITALS — WEIGHT: 209 LBS | BODY MASS INDEX: 35.87 KG/M2

## 2025-03-10 DIAGNOSIS — Z13.1 DIABETES MELLITUS SCREENING: ICD-10-CM

## 2025-03-10 DIAGNOSIS — E66.811 CLASS 1 OBESITY: Primary | ICD-10-CM

## 2025-03-10 PROCEDURE — 97803 MED NUTRITION INDIV SUBSEQ: CPT | Mod: 95 | Performed by: DIETITIAN, REGISTERED

## 2025-03-10 NOTE — PATIENT INSTRUCTIONS
Follow the Healthy Plate Method at meals- see handout.  This will help to increase your vegetable intake and decrease portion sizes of carbohydrates.   - Continue to maintain healthy meals routines of 2-3 meals per day with high protein snacks.   - For high protein snack ideas check out: https://www.CIHI.SnappCloud/article/847851/10-high-protein-snacks-that-keep-you-feeling-full-longer/  Include protein with all meals and snacks.  Lean protein are better for cholesterol levels such as chicken(no skin), fish (baked or broiled or grilled), low-fat dairy, eggs, and peanut butter or nuts.   - Aim for 25g of protein per meal or 75g per day.    - Protein drinks between meals such as Premier Protein, Muscle Milk or Fairlife can help meet protein needs.   4.   Continue reducing your weight by 1-2# per week to reach your goal weight.     5.   Aim to drink 64 oz of water daily  6.   Aim for 30 minutes of exercise on most days.

## 2025-03-10 NOTE — PROGRESS NOTES
Nutrition Assessment     Reason for Visit:  Radha Alvarez is a 40 y.o. female who presents for follow- nutrition counseling via telehealth.    Anthropometrics:  Wt Readings from Last 10 Encounters:   03/10/25 94.8 kg (209 lb)   01/09/25 102 kg (225 lb)   12/06/24 107 kg (234 lb 12.8 oz)   12/02/24 104 kg (229 lb)   07/07/22 97.6 kg (215 lb 1 oz)   12/23/20 101 kg (222 lb)   11/25/20 101 kg (222 lb)   11/17/20 110 kg (242 lb 15.2 oz)   11/05/20 112 kg (246 lb)   10/29/20 110 kg (243 lb)     Body mass index is 35.87 kg/m².    Anthropometrics  Weight: 94.8 kg (209 lb)  Food And Nutrient Intake:    Food and Nutrient History  Food and Nutrient History: Pt presents for nutrition counseling in the context of weight management.  Pt is in the 3rd month of Wegovy and it wroking well for her.  Pt is down 20# over 3 months.  Her BMI is decreased to 36.  Diet recall reveals that is eating 2-3 meals per day.  her appetite is decreased but she is trying to maintain her meal routines.  Pt estimates she is consuming approx 40% of meals.  Pt states that she feels her hair is thinning.  It is possible she is not getting enough protein.  Recommend pt aim for 25g per meal or 75g per day.   Suggest she increase high protein snacks.  She may benefit from a protein drink such as Premier protein daily for 30g of protein per day.  Energy Intake: Fair 50-75 %     Food Intake  Meal 1: egg and coffee  Meal 2: salad but sometimes skips  Snacks : candy  Meal 3: mother in law cooks- vegetables, meat- 40% of her usual meal intakes  Fluid Intake: sodas(Ginger ale) or juice; lacks water     Physical Activities:  Physical Activity  Frequency (times/week): 2 (times/week)  Duration (minutes/day): 45 minutes/day  Type of Physical Activity: [employer]- pt having spinal pain right now; swimming also       Nutrition Focused Physical Exam:  Deferred- telehealth visit     Energy Needs  Calculated Energy Needs Using Equations  Weight Used for Equation  Calculations: 105 kg (231 lb)  Activity Factor: 1.3  Total Energy Needs: 2213  Estimated Energy Needs  Total Energy Estimated Needs in 24 hours (kCal): 1713 kCal  Method for Estimating Needs: MSJ x 1.3-500 kcals    Nutrition Diagnosis   Malnutrition Diagnosis  Patient has Malnutrition Diagnosis: No  Nutrition Diagnosis  Patient has Nutrition Diagnosis: Yes  Diagnosis Status (1): Resolved  Additional Nutrition Diagnosis: Diagnosis 2  Diagnosis Status (2): New  Nutrition Diagnosis 2: Inadequate protein intake  Related to (2): decreased appetite with GLP-1 medication  As Evidenced by (2): diet recall; pt interview    Nutrition Interventions/Recommendations   Food and Nutrition Delivery  Medical Food Supplement: Commercial beverage medical food supplement therapy  Goals: Premier Protein daily  Nutrition Education  Nutrition Education Content: Content related nutrition education, Education on nutrition's influence on health, Physical activity guidance  Goals: Instructed on counting macronutrient intake, proper portion sizes,  and general healthful nutrition. Instructed on benefits of wt loss with heart health. Instructed pt to not skip meals - discussed this may lead to over eating later or snacking more than planned. Instructed on importance of physical activity for wt loss and maintenance of wt loss. Both verbal and written education provided in the one-on-one setting. Pt verbalized understanding of information provided. All questions answered to pt satisfaction throughout visit        Patient Instructions   Follow the Healthy Plate Method at meals- see handout.  This will help to increase your vegetable intake and decrease portion sizes of carbohydrates.   - Continue to maintain healthy meals routines of 2-3 meals per day with high protein snacks.   - For high protein snack ideas check out: https://www.PingThings.com/article/436296/10-high-protein-snacks-that-keep-you-feeling-full-longer/  Include protein with all  meals and snacks.  Lean protein are better for cholesterol levels such as chicken(no skin), fish (baked or broiled or grilled), low-fat dairy, eggs, and peanut butter or nuts.   - Aim for 25g of protein per meal or 75g per day.    - Protein drinks between meals such as Premier Protein, Muscle Milk or Fairlife can help meet protein needs.   4.   Continue reducing your weight by 1-2# per week to reach your goal weight.     5.   Aim to drink 64 oz of water daily  6.   Aim for 30 minutes of exercise on most days.        Nutrition Monitoring and Evaluation   Food and Nutrient Intake  Monitoring and Evaluation Plan: Energy intake, Meal/snack pattern, Fiber intake, Carbohydrate intake, Amount of food, Fluid intake, Protein intake  Criteria: 1600 kcal/day  Criteria: aim for at least 64 oz of water daily  Criteria: Aim for 3 meals/day with 1-2 snack  Meal/Snack Pattern: Food variety, Estimated meal and snack pattern  Criteria: Follow plate method when planning meals (1/2 plate non-starchy vegetables, 1/4 plate lean protein, 1/4 plate carbohydrates).  Criteria: Choose lean protein sources including chicken, turkey, seafood, and eggs. Be sure to include protein with each meal and snack  Criteria: Limit added sugar to less than 25 g per day  Biochemical Data, Medical Tests and Procedures  Monitoring and Evaluation Plan: Lipid profile  Lipid Profile: Triglycerides, serum  Criteria: wnl's.      Readiness to Change : Good  Level of Understanding : Good  Anticipated Compliant : Good

## 2025-03-27 DIAGNOSIS — E66.813 CLASS 3 SEVERE OBESITY DUE TO EXCESS CALORIES WITHOUT SERIOUS COMORBIDITY WITH BODY MASS INDEX (BMI) OF 40.0 TO 44.9 IN ADULT: ICD-10-CM

## 2025-03-27 DIAGNOSIS — E66.01 CLASS 3 SEVERE OBESITY DUE TO EXCESS CALORIES WITHOUT SERIOUS COMORBIDITY WITH BODY MASS INDEX (BMI) OF 40.0 TO 44.9 IN ADULT: ICD-10-CM

## 2025-03-28 RX ORDER — SEMAGLUTIDE 1 MG/.5ML
1 INJECTION, SOLUTION SUBCUTANEOUS
Qty: 2 ML | Refills: 0 | Status: SHIPPED | OUTPATIENT
Start: 2025-03-28

## 2025-03-31 ENCOUNTER — APPOINTMENT (OUTPATIENT)
Dept: PRIMARY CARE | Facility: CLINIC | Age: 40
End: 2025-03-31
Payer: COMMERCIAL

## 2025-03-31 VITALS
DIASTOLIC BLOOD PRESSURE: 71 MMHG | WEIGHT: 214 LBS | BODY MASS INDEX: 36.73 KG/M2 | TEMPERATURE: 98 F | HEART RATE: 87 BPM | SYSTOLIC BLOOD PRESSURE: 105 MMHG

## 2025-03-31 DIAGNOSIS — B37.31 CANDIDA VAGINITIS: ICD-10-CM

## 2025-03-31 DIAGNOSIS — G89.29 CHRONIC MIDLINE LOW BACK PAIN WITH BILATERAL SCIATICA: ICD-10-CM

## 2025-03-31 DIAGNOSIS — L65.9 HAIR LOSS: Primary | ICD-10-CM

## 2025-03-31 DIAGNOSIS — M54.42 CHRONIC MIDLINE LOW BACK PAIN WITH BILATERAL SCIATICA: ICD-10-CM

## 2025-03-31 DIAGNOSIS — K21.9 GASTROESOPHAGEAL REFLUX DISEASE WITHOUT ESOPHAGITIS: ICD-10-CM

## 2025-03-31 DIAGNOSIS — M54.41 CHRONIC MIDLINE LOW BACK PAIN WITH BILATERAL SCIATICA: ICD-10-CM

## 2025-03-31 PROCEDURE — 99214 OFFICE O/P EST MOD 30 MIN: CPT | Performed by: FAMILY MEDICINE

## 2025-03-31 PROCEDURE — 1036F TOBACCO NON-USER: CPT | Performed by: FAMILY MEDICINE

## 2025-03-31 RX ORDER — IBUPROFEN 800 MG/1
800 TABLET ORAL 3 TIMES DAILY PRN
Qty: 60 TABLET | Refills: 5 | Status: SHIPPED | OUTPATIENT
Start: 2025-03-31 | End: 2026-03-31

## 2025-03-31 RX ORDER — FLUCONAZOLE 150 MG/1
150 TABLET ORAL ONCE
Qty: 2 TABLET | Refills: 0 | Status: SHIPPED | OUTPATIENT
Start: 2025-03-31 | End: 2025-03-31

## 2025-03-31 ASSESSMENT — ENCOUNTER SYMPTOMS: HIP PAIN: 1

## 2025-03-31 ASSESSMENT — PATIENT HEALTH QUESTIONNAIRE - PHQ9
SUM OF ALL RESPONSES TO PHQ9 QUESTIONS 1 & 2: 0
1. LITTLE INTEREST OR PLEASURE IN DOING THINGS: NOT AT ALL
2. FEELING DOWN, DEPRESSED OR HOPELESS: NOT AT ALL

## 2025-03-31 NOTE — PROGRESS NOTES
Subjective   Patient ID: Radha Alvarez is a 40 y.o. female who presents for Hip Pain and Shoulder Pain.  Hip Pain     Shoulder Pain     The patient is a 39 yo AA female with a history of left sciatica, anxiety and depression, obesity, PCOS, vit D deficiency, here for:    1- left sciatica under the care of pain management at [facility]. She has developed a right sciatica pain over the time that is affecting her right hip and knee affecting her sleep.    2- hair loss for the past few months.     3- GERD and yeast infection from Wegovy.    A review of system was completed.  All systems were reviewed and were normal, except for the ones that are listed in the HPI.    Objective   Physical Exam  Constitutional:       Appearance: Normal appearance.   HENT:      Head: Normocephalic and atraumatic.      Right Ear: Tympanic membrane, ear canal and external ear normal.      Left Ear: Tympanic membrane, ear canal and external ear normal.      Nose: Nose normal.      Mouth/Throat:      Mouth: Mucous membranes are moist.      Pharynx: Oropharynx is clear.   Eyes:      Extraocular Movements: Extraocular movements intact.      Conjunctiva/sclera: Conjunctivae normal.      Pupils: Pupils are equal, round, and reactive to light.   Cardiovascular:      Rate and Rhythm: Normal rate and regular rhythm.      Pulses: Normal pulses.   Pulmonary:      Effort: Pulmonary effort is normal.      Breath sounds: Normal breath sounds.   Abdominal:      General: Abdomen is flat. Bowel sounds are normal.      Palpations: Abdomen is soft.   Musculoskeletal:         General: Normal range of motion.      Cervical back: Normal range of motion and neck supple.   Skin:     General: Skin is warm.   Neurological:      General: No focal deficit present.      Mental Status: She is alert and oriented to person, place, and time. Mental status is at baseline.   Psychiatric:         Mood and Affect: Mood normal.         Behavior: Behavior normal.          Thought Content: Thought content normal.         Judgment: Judgment normal.     Assessment/Plan   Problem List Items Addressed This Visit       Hair loss - Primary    Relevant Orders    Ferritin    Iron and TIBC    TSH with reflex to Free T4 if abnormal    Referral to Dermatology    Referral to Dermatology    Gastroesophageal reflux disease without esophagitis     -From Wegovy side effects.  -PPI declined.   -Diet and life style changes discussed.          Candida vaginitis     -Fluconazole 150 mg once started.          Relevant Medications    fluconazole (Diflucan) 150 mg tablet    Chronic midline low back pain with bilateral sciatica     -Ibuprofen 800 mg TID for 10 days then PRN started.  -Right hip X-ray ordered.          Relevant Medications    ibuprofen 800 mg tablet    Other Relevant Orders    XR hip right with pelvis when performed 2 or 3 views      Patient to return to office in 2- 4 weeks.

## 2025-04-15 ENCOUNTER — APPOINTMENT (OUTPATIENT)
Facility: CLINIC | Age: 40
End: 2025-04-15
Payer: COMMERCIAL

## 2025-04-15 ENCOUNTER — HOSPITAL ENCOUNTER (OUTPATIENT)
Dept: RADIOLOGY | Facility: CLINIC | Age: 40
Discharge: HOME | End: 2025-04-15
Payer: COMMERCIAL

## 2025-04-15 VITALS
WEIGHT: 211 LBS | SYSTOLIC BLOOD PRESSURE: 118 MMHG | HEIGHT: 64 IN | BODY MASS INDEX: 36.02 KG/M2 | DIASTOLIC BLOOD PRESSURE: 80 MMHG

## 2025-04-15 DIAGNOSIS — Z12.4 CERVICAL CANCER SCREENING: ICD-10-CM

## 2025-04-15 DIAGNOSIS — M54.41 CHRONIC MIDLINE LOW BACK PAIN WITH BILATERAL SCIATICA: ICD-10-CM

## 2025-04-15 DIAGNOSIS — M54.42 CHRONIC MIDLINE LOW BACK PAIN WITH BILATERAL SCIATICA: ICD-10-CM

## 2025-04-15 DIAGNOSIS — G89.29 CHRONIC MIDLINE LOW BACK PAIN WITH BILATERAL SCIATICA: ICD-10-CM

## 2025-04-15 DIAGNOSIS — Z01.419 WELL WOMAN EXAM WITH ROUTINE GYNECOLOGICAL EXAM: Primary | ICD-10-CM

## 2025-04-15 DIAGNOSIS — R37 SEXUAL DYSFUNCTION: ICD-10-CM

## 2025-04-15 PROCEDURE — 73502 X-RAY EXAM HIP UNI 2-3 VIEWS: CPT | Mod: RIGHT SIDE | Performed by: RADIOLOGY

## 2025-04-15 PROCEDURE — 73502 X-RAY EXAM HIP UNI 2-3 VIEWS: CPT | Mod: RT

## 2025-04-15 PROCEDURE — 3008F BODY MASS INDEX DOCD: CPT | Performed by: STUDENT IN AN ORGANIZED HEALTH CARE EDUCATION/TRAINING PROGRAM

## 2025-04-15 PROCEDURE — 1036F TOBACCO NON-USER: CPT | Performed by: STUDENT IN AN ORGANIZED HEALTH CARE EDUCATION/TRAINING PROGRAM

## 2025-04-15 PROCEDURE — 99396 PREV VISIT EST AGE 40-64: CPT | Performed by: STUDENT IN AN ORGANIZED HEALTH CARE EDUCATION/TRAINING PROGRAM

## 2025-04-15 ASSESSMENT — PAIN SCALES - GENERAL: PAINLEVEL_OUTOF10: 0-NO PAIN

## 2025-04-15 NOTE — PROGRESS NOTES
Subjective   Radha Alvarez is a 40 y.o.  who presents today for an annual exam.     Concerns today: Sexual see below    -Diet: likes sugar, Wegovy is helping curb that craving, trying to cut back sugar soda. Eats healthy otherwise, seafood for protein, vegetable heavy  -Exercise: yes, when spine pain isn't prohibitive, running     GynHx:  -Menses: irregular currently  -LMP: 4/7  -Sexual Activity: not currently   -Sexual Concerns: only can have clitoral orgasms and wondering if she would be able to achieve otherwise   Stopped using toys for spiritual reasons   Interested in exploring her sexuality at this point in her life and would like referral to sexual health  -Contraception: no  -Prior STIs: remote h/o age 18 trich  -STI Screening: declines  -Last pap:   NILM HPV neg    OBHx: P1, c/s   3 missed ABs  PMHx: degenerative disc disease, sciatic nerve damage, sometimes situational depression   SurgHx: c/s   Meds: Wegovy  Social:  2x/month, 1 drink  No tobacco use  No drug use  Lives with her daughter- 4 years old, her mother just moved in  Works in [employer]  FHx:  Paternal grandmother- breast cancer, colon cancer, uterine cancer  Mother- breast abnormality but no mastectomy    Objective   Physical Exam  Vitals:    04/15/25 1445   BP: 118/80      Gen: awake, alert  Head: NCAT  HEENT: moist mucus membranes  Breast: normal breast and axillary exam bilaterally  Pulm: breathing comfortably on room air  CV: warm and well-perfused  Abd: soft non tender non distended  :   Normal vulva exam  Normal vaginal mucosa  Normal discharge  Cervix appears normal  Neuro: alert and oriented  Psych: appropriate affect     Assessment/Plan     Radha Alvarez is a 40 y.o.  who presents today for an annual exam.     Health Maintenance  -Pap: today  -Mammogram: ordered, encouraged to obtain  -STI: declines  -Contraception: not currently sexually active, has rx for nuvaring if she does start  -Referral to OB  behavioral health for sexual function    Follow up in 1 year for annual exam     Desiree Mina MD

## 2025-04-16 LAB
FERRITIN SERPL-MCNC: 32 NG/ML (ref 16–232)
IRON SATN MFR SERPL: 23 % (CALC) (ref 16–45)
IRON SERPL-MCNC: 72 MCG/DL (ref 40–190)
TIBC SERPL-MCNC: 320 MCG/DL (CALC) (ref 250–450)
TSH SERPL-ACNC: 0.81 MIU/L

## 2025-04-25 LAB
CYTOLOGY CMNT CVX/VAG CYTO-IMP: NORMAL
HPV HR 12 DNA GENITAL QL NAA+PROBE: NEGATIVE
HPV HR GENOTYPES PNL CVX NAA+PROBE: NEGATIVE
HPV16 DNA SPEC QL NAA+PROBE: NEGATIVE
HPV18 DNA SPEC QL NAA+PROBE: NEGATIVE
LAB AP HPV GENOTYPE QUESTION: YES
LAB AP HPV HR: NORMAL
LABORATORY COMMENT REPORT: NORMAL
PATH REPORT.TOTAL CANCER: NORMAL

## 2025-05-05 DIAGNOSIS — E66.813 CLASS 3 SEVERE OBESITY DUE TO EXCESS CALORIES WITHOUT SERIOUS COMORBIDITY WITH BODY MASS INDEX (BMI) OF 40.0 TO 44.9 IN ADULT: ICD-10-CM

## 2025-05-06 ENCOUNTER — TELEPHONE (OUTPATIENT)
Dept: PRIMARY CARE | Facility: CLINIC | Age: 40
End: 2025-05-06

## 2025-05-06 RX ORDER — SEMAGLUTIDE 1 MG/.5ML
1 INJECTION, SOLUTION SUBCUTANEOUS
Qty: 2 ML | Refills: 0 | Status: SHIPPED | OUTPATIENT
Start: 2025-05-06

## 2025-05-09 ENCOUNTER — TELEPHONE (OUTPATIENT)
Dept: PRIMARY CARE | Facility: CLINIC | Age: 40
End: 2025-05-09
Payer: COMMERCIAL

## 2025-06-02 ENCOUNTER — APPOINTMENT (OUTPATIENT)
Dept: PRIMARY CARE | Facility: CLINIC | Age: 40
End: 2025-06-02
Payer: COMMERCIAL

## 2025-06-09 ENCOUNTER — TELEMEDICINE CLINICAL SUPPORT (OUTPATIENT)
Dept: NUTRITION | Facility: HOSPITAL | Age: 40
End: 2025-06-09
Payer: COMMERCIAL

## 2025-06-09 VITALS — BODY MASS INDEX: 35.1 KG/M2 | WEIGHT: 205.6 LBS | HEIGHT: 64 IN

## 2025-06-09 DIAGNOSIS — E66.812 CLASS 2 OBESITY WITHOUT SERIOUS COMORBIDITY WITH BODY MASS INDEX (BMI) OF 35.0 TO 35.9 IN ADULT, UNSPECIFIED OBESITY TYPE: Primary | ICD-10-CM

## 2025-06-09 PROCEDURE — 97803 MED NUTRITION INDIV SUBSEQ: CPT | Mod: 95 | Performed by: DIETITIAN, REGISTERED

## 2025-06-09 NOTE — PATIENT INSTRUCTIONS
Continue with you healthy meal routines.  If you do not have time for a full breakfast you can snack on Greek yogurt and some nuts with fruit.  Premier protein can also help you to reach your protein goals.  - Aim for 90 grams per day.    2.  Continue to hydrate well- 64 oz of water per day.  Increasing potassium can be helpful with cramps such as cantaloupe, OJ, tomatoes and potatoes.  3.  Consider tracking your calorie intakes occasionally on an cande such as eStartAcademy.com or GetAutoBids.  Aim for 1500 calories per day.

## 2025-06-09 NOTE — PROGRESS NOTES
"Nutrition Assessment     Reason for Visit:  Radha Alvarez is a 40 y.o. female who presents for follow-up telehealth visit.  Virtual or Telephone Consent    An interactive audio and video telecommunication system which permits real time communications between the patient (at the originating site) and provider (at the distant site) was utilized to provide this telehealth service.   Verbal consent was requested and obtained from Radha Alvarez on this date, 06/09/25 for a telehealth visit and the patient's location was confirmed at the time of the visit.    Anthropometrics:  Wt Readings from Last 10 Encounters:   06/09/25 93.3 kg (205 lb 9.6 oz)   04/15/25 95.7 kg (211 lb)   03/31/25 97.1 kg (214 lb)   03/10/25 94.8 kg (209 lb)   01/09/25 102 kg (225 lb)   12/06/24 107 kg (234 lb 12.8 oz)   12/02/24 104 kg (229 lb)   07/07/22 97.6 kg (215 lb 1 oz)   12/23/20 101 kg (222 lb)   11/25/20 101 kg (222 lb)     Lab Results   Component Value Date    HGBA1C 5.2 12/06/2024     Meds: Wegovy  Anthropometrics  Height: 162.6 cm (5' 4\")  Weight: 93.3 kg (205 lb 9.6 oz)  Food And Nutrient Intake:  Food and Nutrient History  Food and Nutrient History: Pt presents for follow-up nutrition counseling in the context of weight management.  Pt continues on Wegovy and has an increased dose.  Pt is down 20# over the past 5 months.  Her BMI is decreased to 35.  Diet recall reveals that is eating 2-3 meals per day.  Her appetite is decreased but she is trying to maintain her meal routines.  Pt has not been eating breakfast due to change in her work schedule but incorporates Premier protein in her coffee.  Pt has been working to cut back on FindThat[employer] and cut out candy.  Pt had some hair loss- labs were tested with normal results.  It is possible she is not getting enough protein. Pt has a new goal of consuming 30 g of protein per meal or 90 g daily.  Also pt is having muscle cramps in her legs- encourage her to increase high potassium foods. 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; coffee; Premier Protein now and adds to the coffee  Snacks: Greek yogurt; add fresh fruit- Oikos  Meal 2: salad but sometimes skips  Snacks : cut back on candy  Meal 3: mother in law cooks- vegetables, meat- 40% of her usual meal intakes  Fluid Intake: cut back on the Ginger Ale; tried Arvin,       Physical Activities:  Physical Activity  Type of Physical Activity: none currently- pt has a spinal cord injury making it difficult     Nutrition Focused Physical Exam:  Subcutaneous Fat Loss  Defer Subcutaneous Fat Loss Assessment: Defer all  Defer All Reason: telehealth visit  Muscle Wasting  Defer Muscle Wasting Assessment: Defer all    Energy Needs  Calculated Energy Needs Using Equations  Height: 162.6 cm (5' 4\")  Weight Used for Equation Calculations: 93 kg (205 lb)  Chris Montgomery Equation (Overweight or Obese Patients): 1580  Activity Factor: 1.3  Total Energy Needs: 2054  Estimated Energy Needs  Total Energy Estimated Needs in 24 hours (kCal): 1554 kCal  Method for Estimating Needs: MSJ x 1.3-500 kcals    Nutrition Diagnosis   Malnutrition Diagnosis  Patient has Malnutrition Diagnosis: No  Nutrition Diagnosis  Patient has Nutrition Diagnosis: Yes  Diagnosis Status (1): Active  Related to (1): predicted excessive energy intake  As Evidenced by (1): diet recall; pt interview  Additional Nutrition Diagnosis: Diagnosis 2  Diagnosis Status (2): Active  Nutrition Diagnosis 2: Inadequate protein intake  Related to (2): decreased appetite with GLP-1 medication  As Evidenced by (2): diet recall; pt interview    Nutrition Interventions/Recommendations   Nutrition Prescription  Individualized Nutrition Prescription Provided for : Oral nutrition  Individualized Nutrition Prescription Provided for : 1500 kcals, 90 g of protein per day; decreased sugary beverages  Individualized Nutrition Prescription Provided for : 1500 kcals, 90 g of protein per " day; decreased sugary beverages  Food and Nutrition Delivery  Meals & Snacks: General Healthful Diet, Protein-modified diet, Mineral-modified diet  Goals: Healthy Plate; 30 g of protein per meal; increased K+  Goals: Premier Protein daily  Nutrition Education  Nutrition Education Content: Content related nutrition education, Education on nutrition's influence on health, Physical activity guidance  Goals: Instructed on counting macronutrient intake, proper portion sizes, and general healthful nutrition. Instructed on benefits of wt loss with heart health. Instructed pt to not skip meals - discussed this may lead to over eating later or snacking more than planned. Instructed on importance of physical activity for wt loss and maintenance of wt loss. Both verbal and written education provided in the one-on-one setting. Pt verbalized understanding of information provided. All questions answered to pt satisfaction throughout visit  Nutrition Counseling  Theoretical Basis/Approach: Nutrition counseling based on transtheoretical model stages of change approach  Nutrition Counseling Strategies : Nutrition counseling based on goal setting strategy, Nutrition counseling based on self-monitoring strategy  Goals: Cutting back on sugary beverage- Ginger ale; increase protein to 30 g per meal        There are no Patient Instructions on file for this visit.    Nutrition Monitoring and Evaluation   Food and Nutrient Intake  Monitoring and Evaluation Plan: Energy intake, Meal/snack pattern, Fiber intake, Amount of food, Fluid intake, Protein intake  Criteria: 1500 day  Criteria: aim for at least 64 oz of water daily  Criteria: Aim for 3 meals/day with 1-2 snack  Criteria: Follow plate method when planning meals (1/2 plate non-starchy vegetables, 1/4 plate lean protein, 1/4 plate carbohydrates).  Criteria: Choose lean protein sources including chicken, turkey, seafood, and eggs. Be sure to include protein with each meal and  snack  Criteria: Increase fiber from fruits, vegetables, whole grains, and beans/lentils  Additional Plan: Provided pt with the following handouts: wt loss tips, 1500 calorie 5 day meal plan, high protein foods/snack list, exercise, label reading, plate method         Readiness to Change : Good  Level of Understanding : Good  Anticipated Compliant : Good

## 2025-06-13 DIAGNOSIS — E66.813 CLASS 3 SEVERE OBESITY DUE TO EXCESS CALORIES WITHOUT SERIOUS COMORBIDITY WITH BODY MASS INDEX (BMI) OF 40.0 TO 44.9 IN ADULT: ICD-10-CM

## 2025-06-13 RX ORDER — SEMAGLUTIDE 1 MG/.5ML
1 INJECTION, SOLUTION SUBCUTANEOUS
Qty: 2 ML | Refills: 0 | Status: SHIPPED | OUTPATIENT
Start: 2025-06-13

## 2025-07-17 DIAGNOSIS — E66.813 CLASS 3 SEVERE OBESITY DUE TO EXCESS CALORIES WITHOUT SERIOUS COMORBIDITY WITH BODY MASS INDEX (BMI) OF 40.0 TO 44.9 IN ADULT: ICD-10-CM

## 2025-07-17 RX ORDER — SEMAGLUTIDE 1 MG/.5ML
1 INJECTION, SOLUTION SUBCUTANEOUS
Qty: 9 ML | Refills: 0 | Status: SHIPPED | OUTPATIENT
Start: 2025-07-17

## 2025-08-06 DIAGNOSIS — Z12.31 ENCOUNTER FOR SCREENING MAMMOGRAM FOR BREAST CANCER: ICD-10-CM
